# Patient Record
Sex: FEMALE | Race: WHITE | NOT HISPANIC OR LATINO | Employment: UNEMPLOYED | URBAN - METROPOLITAN AREA
[De-identification: names, ages, dates, MRNs, and addresses within clinical notes are randomized per-mention and may not be internally consistent; named-entity substitution may affect disease eponyms.]

---

## 2023-04-30 NOTE — PROGRESS NOTES
"Subjective   Patient ID: Rhona Vincent is a 32 y.o. female who presents for Palpitations (She is 22 weeks pregnant.  ).    HPI     31 yo female, who is 22 wks pregnant, presents c/o palpitations  Began late march  Come and go  No particular trigger.   Co rapid heart beat; no fluttering  Has had occ assoc sob.   Brief episodes   On Friday she had a episode of her hands going numb and even her lip started feeling numb  Saw her gyn dr carlisle on fri and had the episode while seeing him  BP and HR were ok per pt  Does not feel the palpitations are triggered by stress but after they happen she does feel nervous or anxious about them      No assoc chest pains  Occ feels lightheaded  Had CBC in feb- wnl and TSH in march wnl  Miscarriage last  summer    Has 2 sons and this baby is a boy    Do not happen when working out  No recent URI, fevers or chills    No nausea, vomitting, reflux, abd pain. No diarrhea or constipation issues;  no LE edema;     Review of Systems  ROS as stated in HPI    Objective   /76   Pulse 90   Temp 36.9 °C (98.5 °F)   Ht 1.676 m (5' 6\")   Wt 57.4 kg (126 lb 9.6 oz)   BMI 20.43 kg/m²     Physical Exam  Vitals and nursing note reviewed.   Constitutional:       Appearance: Normal appearance. She is normal weight.   HENT:      Head: Normocephalic and atraumatic.      Right Ear: Tympanic membrane, ear canal and external ear normal.      Left Ear: Tympanic membrane, ear canal and external ear normal.      Nose: Nose normal.      Mouth/Throat:      Mouth: Mucous membranes are moist.      Pharynx: Oropharynx is clear.   Eyes:      Extraocular Movements: Extraocular movements intact.      Conjunctiva/sclera: Conjunctivae normal.      Pupils: Pupils are equal, round, and reactive to light.   Cardiovascular:      Rate and Rhythm: Normal rate and regular rhythm.   Pulmonary:      Effort: Pulmonary effort is normal.      Breath sounds: Normal breath sounds.   Abdominal:      General: Abdomen is " flat. Bowel sounds are normal.      Palpations: Abdomen is soft.   Musculoskeletal:         General: Normal range of motion.      Cervical back: Neck supple.   Skin:     General: Skin is warm and dry.   Neurological:      General: No focal deficit present.      Mental Status: She is alert and oriented to person, place, and time.   Psychiatric:         Mood and Affect: Mood normal.         Behavior: Behavior normal.         Thought Content: Thought content normal.         Judgment: Judgment normal.         Assessment/Plan   Problem List Items Addressed This Visit    None  Visit Diagnoses       Healthcare maintenance    -  Primary    Relevant Orders    CBC    Comprehensive Metabolic Panel    TSH with reflex to Free T4 if abnormal    Heart palpitations        Relevant Orders    Holter or Event Cardiac Monitor    Transthoracic Echo (TTE) Complete    ECG 12 lead (Clinic Performed)          Check EKG  Check labs  Check holter and echo  Fu or go to ER if persists or worsens

## 2023-05-01 ENCOUNTER — OFFICE VISIT (OUTPATIENT)
Dept: PRIMARY CARE | Facility: CLINIC | Age: 33
End: 2023-05-01
Payer: COMMERCIAL

## 2023-05-01 VITALS
HEIGHT: 66 IN | DIASTOLIC BLOOD PRESSURE: 76 MMHG | SYSTOLIC BLOOD PRESSURE: 120 MMHG | BODY MASS INDEX: 20.34 KG/M2 | HEART RATE: 90 BPM | WEIGHT: 126.6 LBS | TEMPERATURE: 98.5 F

## 2023-05-01 DIAGNOSIS — Z00.00 HEALTHCARE MAINTENANCE: Primary | ICD-10-CM

## 2023-05-01 DIAGNOSIS — R00.2 HEART PALPITATIONS: ICD-10-CM

## 2023-05-01 LAB
NON-UH HIE A/G RATIO: 1
NON-UH HIE ALB: 3.2 G/DL (ref 3.4–5)
NON-UH HIE ALK PHOS: 56 UNIT/L (ref 46–116)
NON-UH HIE BILIRUBIN, TOTAL: 0.5 MG/DL (ref 0.2–1)
NON-UH HIE BUN/CREAT RATIO: 16.7
NON-UH HIE BUN: 10 MG/DL (ref 9–23)
NON-UH HIE CALCIUM: 9 MG/DL (ref 8.7–10.4)
NON-UH HIE CALCULATED OSMOLALITY: 274 MOSM/KG (ref 275–295)
NON-UH HIE CHLORIDE: 106 MMOL/L (ref 98–107)
NON-UH HIE CO2, VENOUS: 28 MMOL/L (ref 20–31)
NON-UH HIE CREATININE: 0.6 MG/DL (ref 0.5–0.8)
NON-UH HIE GFR AA: >60
NON-UH HIE GLOBULIN: 3.2 G/DL
NON-UH HIE GLOMERULAR FILTRATION RATE: >60 ML/MIN/1.73M?
NON-UH HIE GLUCOSE: 76 MG/DL (ref 74–106)
NON-UH HIE GOT: 18 UNIT/L (ref 15–37)
NON-UH HIE GPT: 7 UNIT/L (ref 10–49)
NON-UH HIE HCT: 37.1 % (ref 36–46)
NON-UH HIE HGB: 12.9 G/DL (ref 12–16)
NON-UH HIE INSTR WBC ND: 7.6
NON-UH HIE K: 4 MMOL/L (ref 3.5–5.1)
NON-UH HIE MCH: 30.1 PG (ref 27–34)
NON-UH HIE MCHC: 34.9 G/DL (ref 32–37)
NON-UH HIE MCV: 86.3 FL (ref 80–100)
NON-UH HIE MPV: 9 FL (ref 7.4–10.4)
NON-UH HIE NA: 138 MMOL/L (ref 135–145)
NON-UH HIE PLATELET: 145 X10 (ref 150–450)
NON-UH HIE RBC: 4.3 X10 (ref 4.2–5.4)
NON-UH HIE RDW: 13.5 % (ref 11.5–14.5)
NON-UH HIE TOTAL PROTEIN: 6.4 G/DL (ref 5.7–8.2)
NON-UH HIE TSH: 2.58 UIU/ML (ref 0.55–4.78)
NON-UH HIE WBC: 7.6 X10 (ref 4.5–11)

## 2023-05-01 PROCEDURE — 99214 OFFICE O/P EST MOD 30 MIN: CPT | Performed by: FAMILY MEDICINE

## 2023-05-01 PROCEDURE — 93000 ELECTROCARDIOGRAM COMPLETE: CPT | Performed by: FAMILY MEDICINE

## 2023-05-01 PROCEDURE — 1036F TOBACCO NON-USER: CPT | Performed by: FAMILY MEDICINE

## 2023-05-01 RX ORDER — FOLIC ACID/MULTIVIT,IRON,MINER 0.4MG-18MG
TABLET ORAL
COMMUNITY
Start: 2023-02-02

## 2023-05-01 ASSESSMENT — PATIENT HEALTH QUESTIONNAIRE - PHQ9
2. FEELING DOWN, DEPRESSED OR HOPELESS: NOT AT ALL
1. LITTLE INTEREST OR PLEASURE IN DOING THINGS: NOT AT ALL
SUM OF ALL RESPONSES TO PHQ9 QUESTIONS 1 AND 2: 0

## 2023-05-03 ENCOUNTER — TELEPHONE (OUTPATIENT)
Dept: PRIMARY CARE | Facility: CLINIC | Age: 33
End: 2023-05-03
Payer: COMMERCIAL

## 2023-05-03 DIAGNOSIS — D69.6 THROMBOCYTOPENIA (CMS-HCC): ICD-10-CM

## 2023-05-03 NOTE — TELEPHONE ENCOUNTER
----- Message from Bev Taylor, DO sent at 5/2/2023  7:37 PM EDT -----  Please let pt know that her red blood cell counts, sugar, electrolytes, thyroid, liver, and kidney function are all normal.  Her platelet count was slightly low at 145(should be above 150).   I recommend rechecking this in a couple wks.   Enter order for cbc with diff dx: thrombocytopenia

## 2024-01-11 ENCOUNTER — OFFICE VISIT (OUTPATIENT)
Dept: DERMATOLOGY | Facility: CLINIC | Age: 34
End: 2024-01-11
Payer: COMMERCIAL

## 2024-01-11 DIAGNOSIS — Z12.83 SKIN CANCER SCREENING: Primary | ICD-10-CM

## 2024-01-11 DIAGNOSIS — D22.9 NEVUS: ICD-10-CM

## 2024-01-11 DIAGNOSIS — L81.4 LENTIGO: ICD-10-CM

## 2024-01-11 DIAGNOSIS — L71.9 ROSACEA: ICD-10-CM

## 2024-01-11 PROCEDURE — 1036F TOBACCO NON-USER: CPT | Performed by: NURSE PRACTITIONER

## 2024-01-11 PROCEDURE — 99203 OFFICE O/P NEW LOW 30 MIN: CPT | Performed by: NURSE PRACTITIONER

## 2024-01-11 NOTE — PROGRESS NOTES
Subjective     Rhona Vincent is a 33 y.o. female who presents for the following: Skin Check.     Patient is a patient skin exam patient states that she has recently given birth and during pregnancy she noticed some changing moles.    Review of Systems:  No other skin or systemic complaints other than what is documented elsewhere in the note.    The following portions of the chart were reviewed this encounter and updated as appropriate:       Skin Cancer History  No skin cancer on file.    Specialty Problems    None    Past Medical History:  Rhona Vincent  has no past medical history on file.    Past Surgical History:  Rhona Vincent  has a past surgical history that includes Other surgical history (03/02/2018).    Family History:  Patient family history is not on file.    Social History:  Rhona Vincent  reports that she has never smoked. She has never used smokeless tobacco. She reports that she does not currently use alcohol. She reports that she does not use drugs.    Allergies:  Patient has no known allergies.    Current Medications / CAM's:    Current Outpatient Medications:     PNV cmb#95-ferrous fumarate-FA (Prenatal Multivitamins) 28 mg iron- 800 mcg tablet, 1 tabs, ORAL, DAILY, Refill(s) 0, Date: 02/02/2023 13:48:00 EST, Disp: , Rfl:      Objective   Well appearing patient in no apparent distress; mood and affect are within normal limits.    A full examination was performed including scalp, head, eyes, ears, nose, lips, neck, chest, axillae, abdomen, back, buttocks, bilateral upper extremities, bilateral lower extremities, hands, feet, fingers, toes, fingernails, and toenails. All findings within normal limits unless otherwise noted below.    Assessment/Plan   1. Skin cancer screening    The patient presented for a routine skin examination today. There are no specific concerns regarding skin health and no new or changing moles, lesions, or rashes.     Assessment: Based on the comprehensive  skin examination, there were no concerning or abnormal findings. The patient's skin appeared to be in good health, without any notable dermatologic conditions or lesions.    Plan: Given the absence of any significant skin findings, no specific interventions or treatments are warranted at this time. The patient was educated on the importance of regular skin self-examinations and advised to promptly report any changes or concerns. Routine follow-up for a skin examination was recommended.    -These lesions have benign, reassuring patterns on dermoscopy.  -There were no concerning features found on exam today.  -Recommend continued self-observation, and to contact the office if any changes in nevi are  noticed.    Discussed/information given on safe sun practices and use of sunscreen, sun protective clothing or sun avoidance. Recommend to use OTC medication of sunscreen SPF 30 or higher on a daily basis prior to sun exposure to reduce the risk of skin cancer.    Contact Office if: Any lesions change in size, shape or color; itch, bum or bleed.         2. Lentigo  Benign pigmented macules appearing in sun exposed areas     Solar lentigo (a type of lentigo also known as a senile lentigo, age spot, or liver spot) is a benign pigmented macule appearing on fair-skinned individuals that is related to ultraviolet radiation (UVR) exposure, typically from the sun.     PLAN:  Limiting sun exposure through avoidance, protective clothing, and use of sunscreens can help prevent the appearance of solar lentigines.    If lesion changes or becomes symptomatic she should return to clinic    3. Nevus  Multiple benign appearing flesh colored to pigmented macules and papules     Plan: Counseling.  I counseled the patient regarding the following:  Instructions: Monthly self-skin checks to monitor for any changes in moles are recommended. Expectations: Benign Nevi are pigmented nests of cells within the skin.No treatment is necessary.  Contact Office if: Any moles change in size, shape or color; itch, bum or bleed.    4. Rosacea  Head - Anterior (Face)  Mild erythema on exam, expresses flushing regularly    PLAN:  Patient does not wish to proceed with prescription treatments at this time  Will use OTC azelaic acid 10% and notify us if she wishes to escalate

## 2024-03-27 ENCOUNTER — TELEMEDICINE (OUTPATIENT)
Dept: PRIMARY CARE | Facility: CLINIC | Age: 34
End: 2024-03-27
Payer: COMMERCIAL

## 2024-03-27 DIAGNOSIS — N61.0 MASTITIS: Primary | ICD-10-CM

## 2024-03-27 PROCEDURE — 1036F TOBACCO NON-USER: CPT | Performed by: NURSE PRACTITIONER

## 2024-03-27 PROCEDURE — 99213 OFFICE O/P EST LOW 20 MIN: CPT | Performed by: NURSE PRACTITIONER

## 2024-03-27 RX ORDER — AMOXICILLIN AND CLAVULANATE POTASSIUM 875; 125 MG/1; MG/1
875 TABLET, FILM COATED ORAL 2 TIMES DAILY
Qty: 20 TABLET | Refills: 0 | Status: SHIPPED | OUTPATIENT
Start: 2024-03-27 | End: 2024-04-06

## 2024-03-27 ASSESSMENT — ENCOUNTER SYMPTOMS
SHORTNESS OF BREATH: 0
DIARRHEA: 0
COUGH: 0
CHILLS: 1
ABDOMINAL PAIN: 0
VOMITING: 0
FEVER: 1
FATIGUE: 1
NAUSEA: 0

## 2024-03-28 NOTE — PATIENT INSTRUCTIONS
Antibiotic has been sent to the pharmacy for you   Follow up with OBGYN as discussed    ? Feed your baby when they are hungry. If milk is flowing from the breast with mastitis, you can feed your baby on that side.  ? Only use a breast pump if you need to. Do not try to empty your breast completely, as this can make mastitis worse.  ? Avoid using nipple shields.  ? Hold a cold compress on your breast. This can help with both pain and swelling. Some people find that a warm, wet cloth on the breast feels better than cold. Use whichever feels better.  ? Take acetaminophen (sample brand name: Tylenol) or ibuprofen (sample brand names: Advil, Motrin) to help with pain or fever. Do not take aspirin.  ? Gently massage your breast to help unblock the milk ducts. Start at the part of the breast that is swollen or sore, and stroke gently toward your nipple. This can help improve milk flow.  ? Wear a bra that is supportive and fits well, but is not too tight.  ? Drink plenty of fluids.  ? Rest when possible.

## 2024-03-28 NOTE — PROGRESS NOTES
Subjective   Chief Complaint: No chief complaint on file..    I preformed this visit using real-time telehealth tools, including and audio/video connection between Rhona Vincent location: [place] and Chavo Santo CNP Location: [place]      HPI   Rhona Vincent is a 33 y.o. female who presents for No chief complaint on file..    Patient reports that she believes she has mastitis in her right breast. She started out with flu like symptoms yesterday (body aches, chills, fatigue). Today she noticed a lump in there right part of her right breast.   She is currently breastfeeding her full term 7 month old baby. She is also currently pregnant and 8 weeks along.     Patient reports that she has experienced mastitis in the past (3 years ago) and reports that these symptoms are similar. She had a temperature of 100.1 F earlier.     She denies any warmth or redness of the area.   She plans to reach out to her OBGYN regarding this but would like to have an antibiotic on hand incase it becomes worse   She has been using this right breast to breasfeed and last  at 7pm.      Patient denies nausea, vomiting, diarrhea, chest pain, heart palpations, or shortness of breath.         Review of Systems   Constitutional:  Positive for chills, fatigue and fever.   Respiratory:  Negative for cough and shortness of breath.    Cardiovascular:  Negative for chest pain.   Gastrointestinal:  Negative for abdominal pain, diarrhea, nausea and vomiting.   Skin:         Right breast lump and pain        Objective   There were no vitals taken for this visit.  BSA There is no height or weight on file to calculate BSA.      Physical Exam  Orders Only on 05/01/2023   Component Date Value Ref Range Status    [facility]- [facility] HCT 05/01/2023 37.1  36.0 - 46.0 % Final    [facility] RBC 05/01/2023 4.30  4.20 - 5.40 x10 Final    Note: RBC morphology is normal unless otherwise stated.  Evaluation performed only if differential is requested.     [facility] Platelet 05/01/2023 145 (L)  150 - 450 x10 Final    [facility] Instr WBC ND 05/01/2023 7.6   Final    [facility] MCHC 05/01/2023 34.9  32.0 - 37.0 g/dL Final    [facility] MCV 05/01/2023 86.3  80.0 - 100.0 fL Final    [facility] MPV 05/01/2023 9.0  7.4 - 10.4 fL Final    [facility] HGB 05/01/2023 12.9  12.0 - 16.0 g/dL Final    [facility] WBC 05/01/2023 7.6  4.5 - 11.0 x10 Final    [facility] RDW 05/01/2023 13.5  11.5 - 14.5 % Final    [facility] MCH 05/01/2023 30.1  27.0 - 34.0 pg Final    [facility] TSH 05/01/2023 2.58  0.55 - 4.78 uIU/ml Final    Comment: -  Do not use samples that contain fluorescein. Fluorescein levels > 0.24 ?g/mL may decrease results in this assay  -  Patients undergoing retinal fluorescein angiography can retain amounts of fluorescein in the body for up to 48?72 hours post-treatment. Such samples can produce falsely depressed values when tested with this assay, and should not be tested      [facility] GFR AA 05/01/2023 >60   Final    Comment:  GFR CalcMedical judgement is necessary to interpret GFR.  The calculated GFR may not accurately reflect renal status in patients >70 years, pregnant women, acutely ill hospitalized patients and patients with acute renal failure or known renal disease.  The MDRD GFR formula is valid only for adults greater than 18 years of age.  Note:  Creatinine clearance (not GFR) should be used for drug dosing.      [facility] Bilirubin, Total 05/01/2023 0.50  0.20 - 1.00 mg/dL Final    Use of this assay is not recommended for patients undergoing treatment with eltrombopag due to the potential for falsely elevated results.    [facility] Chloride 05/01/2023 106  98 - 107 mmol/L Final    [facility] A/G Ratio 05/01/2023 1.0   Final    [facility] Na 05/01/2023 138  135 - 145 mmol/L Final    [facility] BUN/Creat Ratio 05/01/2023 16.7   Final    [facility] GPT 05/01/2023 7 (L)  10 - 49 unit/L Final    [facility] Alk Phos 05/01/2023 56  46 - 116  unit/L Final    [facility] Creatinine 05/01/2023 0.6  0.5 - 0.8 mg/dL Final    [facility] Total Protein 05/01/2023 6.4  5.7 - 8.2 g/dL Final    Total Protein results may be increased in patients receiving dextran as a blood volume expander    [facility] CO2, venous 05/01/2023 28.0  20.0 - 31.0 mmol/L Final    NON- [facility] Glomerular Filtration R* 05/01/2023 >60  mL/min/1.73m? Final    Comment: Non- GFR CalcMedical judgement is necessary to interpret GFR.  The calculated GFR may not accurately reflect renal status in patients >70 years, pregnant women, acutely ill hospitalized patients and patients with acute renal failure or known renal disease.  The MDRD GFR formula is valid only for adults greater than 18 years of age.  Note:  Creatinine clearance (not GFR) should be used for drug dosing.      [facility] Calculated Osmolality 05/01/2023 274 (L)  275 - 295 mOsm/kg Final    [facility] K 05/01/2023 4.0  3.5 - 5.1 mmol/L Final    NON- [facility] Globulin 05/01/2023 3.2  g/dL Final    [facility] BUN 05/01/2023 10  9 - 23 mg/dL Final    Comment: -  Venipuncture should occur prior to N-Acetyl Cysteine (NAC) or Metamizole (Sulpyrine) administration due to the potential for falsely depressed results.  -  Blood samples from some patients with monoclonal gammopathies may produce falsely elevated results      [facility] Calcium 05/01/2023 9.0  8.7 - 10.4 mg/dL Final    [facility] GOT 05/01/2023 18  15 - 37 unit/L Final    [facility] Glucose 05/01/2023 76  74 - 106 mg/dL Final    [facility] ALB 05/01/2023 3.2 (L)  3.4 - 5.0 g/dL Final     Current Outpatient Medications on File Prior to Visit   Medication Sig Dispense Refill    PNV cmb#95-ferrous fumarate-FA (Prenatal Multivitamins) 28 mg iron- 800 mcg tablet 1 tabs, ORAL, DAILY, Refill(s) 0, Date: 02/02/2023 13:48:00 EST       No current facility-administered medications on file prior to visit.     No images are attached to the encounter.            Assessment/Plan    Problem List Items Addressed This Visit             ICD-10-CM    Mastitis - Primary N61.0    Relevant Medications    amoxicillin-pot clavulanate (Augmentin) 875-125 mg tablet

## 2024-06-14 ENCOUNTER — HOSPITAL ENCOUNTER (OUTPATIENT)
Dept: RADIOLOGY | Facility: CLINIC | Age: 34
Discharge: HOME | End: 2024-06-14
Payer: COMMERCIAL

## 2024-06-14 DIAGNOSIS — Z34.90 ENCOUNTER FOR SUPERVISION OF NORMAL PREGNANCY, UNSPECIFIED, UNSPECIFIED TRIMESTER (HHS-HCC): ICD-10-CM

## 2024-06-14 PROCEDURE — 76805 OB US >/= 14 WKS SNGL FETUS: CPT

## 2024-12-16 ENCOUNTER — APPOINTMENT (OUTPATIENT)
Dept: PRIMARY CARE | Facility: CLINIC | Age: 34
End: 2024-12-16
Payer: COMMERCIAL

## 2025-02-06 ENCOUNTER — APPOINTMENT (OUTPATIENT)
Dept: PRIMARY CARE | Facility: CLINIC | Age: 35
End: 2025-02-06
Payer: COMMERCIAL

## 2025-04-10 ENCOUNTER — APPOINTMENT (OUTPATIENT)
Dept: PRIMARY CARE | Facility: CLINIC | Age: 35
End: 2025-04-10
Payer: COMMERCIAL

## 2025-05-13 ENCOUNTER — APPOINTMENT (OUTPATIENT)
Dept: PRIMARY CARE | Facility: CLINIC | Age: 35
End: 2025-05-13
Payer: COMMERCIAL

## 2025-05-15 ENCOUNTER — APPOINTMENT (OUTPATIENT)
Dept: PRIMARY CARE | Facility: CLINIC | Age: 35
End: 2025-05-15
Payer: COMMERCIAL

## 2025-06-16 NOTE — PROGRESS NOTES
Subjective   Patient ID: Rhona Vincent is a 34 y.o. female who presents for No chief complaint on file..    HPI   34 you female presents for physical    i last saw pt 5/2023 when she was pregnant and was co palpitations        sees gyn dr carlisle for exams.   has 3 sons   periods are reg;     immunizations:   Tdap 2020 or with last preg?  flu shot  covid shots- had     BMI 20?  has been trying to exercise and eat healthy.   dad with hx of HLD.      She denies any vision changes. no glasses or contacts; does not see optho  sees her dentist for regular cleanings      She denies any chest pains, palpitations, edema, shortness of breath, abdominal pains, reflux, nausea, vomiting, diarrhea, constipation, blood in stool, melena.      Denies any mole changes.     flu shot in fall   had covid shots  Tdap 2020 with preg?  healthy lifestyle-diet, exercise                     .   check labs  sees gyn         Review of Systems  ROS as stated in HPI    Objective   There were no vitals taken for this visit.    Physical Exam  Constitutional: A&O; NAD  HEENT: conjunctiva normal. EOMI; PERRLA; lids normal; TM's clear;   Neck: supple; No lymphadenopathy   Heart: RRR     Lungs: CTA bilateral   Abd: Soft, Nontender, Nondistended  Ext:  No edema;    Neuro: No gross neuro deficits     Psych: Judgment, orientation, mood, affect, insight wnl   Assessment/Plan   {Assess/PlanSmartLinks:00546}

## 2025-06-17 ENCOUNTER — APPOINTMENT (OUTPATIENT)
Dept: PRIMARY CARE | Facility: CLINIC | Age: 35
End: 2025-06-17
Payer: COMMERCIAL

## 2025-06-17 DIAGNOSIS — Z00.00 HEALTHCARE MAINTENANCE: Primary | ICD-10-CM

## 2025-06-19 ENCOUNTER — OFFICE VISIT (OUTPATIENT)
Dept: PRIMARY CARE | Facility: CLINIC | Age: 35
End: 2025-06-19
Payer: COMMERCIAL

## 2025-06-19 ENCOUNTER — APPOINTMENT (OUTPATIENT)
Dept: PRIMARY CARE | Facility: CLINIC | Age: 35
End: 2025-06-19
Payer: COMMERCIAL

## 2025-06-19 VITALS
DIASTOLIC BLOOD PRESSURE: 76 MMHG | BODY MASS INDEX: 19.7 KG/M2 | SYSTOLIC BLOOD PRESSURE: 134 MMHG | HEART RATE: 54 BPM | HEIGHT: 66 IN | TEMPERATURE: 98 F | WEIGHT: 122.6 LBS

## 2025-06-19 DIAGNOSIS — Z00.00 HEALTHCARE MAINTENANCE: Primary | ICD-10-CM

## 2025-06-19 PROCEDURE — 1036F TOBACCO NON-USER: CPT | Performed by: FAMILY MEDICINE

## 2025-06-19 PROCEDURE — 3008F BODY MASS INDEX DOCD: CPT | Performed by: FAMILY MEDICINE

## 2025-06-19 PROCEDURE — 99395 PREV VISIT EST AGE 18-39: CPT | Performed by: FAMILY MEDICINE

## 2025-06-19 RX ORDER — LEVONORGESTREL 52 MG/1
1 INTRAUTERINE DEVICE INTRAUTERINE ONCE
COMMUNITY

## 2025-06-19 ASSESSMENT — PATIENT HEALTH QUESTIONNAIRE - PHQ9
2. FEELING DOWN, DEPRESSED OR HOPELESS: NOT AT ALL
SUM OF ALL RESPONSES TO PHQ9 QUESTIONS 1 AND 2: 0
1. LITTLE INTEREST OR PLEASURE IN DOING THINGS: NOT AT ALL

## 2025-06-19 NOTE — PROGRESS NOTES
"Subjective   Patient ID: Rhona Vincent is a 34 y.o. female who presents for Annual Exam (She is not fasting for blood work today.  Sees GYN for exams. ).    HPI   34 you female presents for physical    i last saw pt 5/2023       sees gyn dr carlisle   has 4 boys (11/2018, 4/2020, 8/2023 10/2024)    stays at home wiht them   has mirena IUD    immunizations:   Tdap 2024  RSV-had with preg  flu shot- in fall   covid shots- had     BMI 19  has been trying to exercise and eat healthy.   dad with hx of HLD.      She denies any vision changes. no glasses or contacts; does not see optho  sees her dentist for regular cleanings      She denies any chest pains, palpitations, edema, shortness of breath, abdominal pains, reflux, nausea, vomiting, diarrhea, constipation, blood in stool, melena.      Denies any mole changes  see derm for skin checks     pgf colon CA  dad with HLD  PGF with colon CA in 80s  mom with HTN/MI/stents at 70yo  MGF with CAD  sister with HLD         Review of Systems  ROS as stated in HPI    Objective   /76   Pulse 54   Temp 36.7 °C (98 °F)   Ht 1.676 m (5' 6\")   Wt 55.6 kg (122 lb 9.6 oz)   Breastfeeding Unknown   BMI 19.79 kg/m²     Physical Exam  Constitutional: A&O; NAD  HEENT: conjunctiva normal. EOMI; PERRLA; lids normal; TM's clear;   Neck: supple; No lymphadenopathy   Heart: RRR     Lungs: CTA bilateral   Abd: Soft, Nontender, Nondistended  Ext:  No edema;    Neuro: No gross neuro deficits     Psych: Judgment, orientation, mood, affect, insight wnl   Assessment/Plan   Problem List Items Addressed This Visit    None  Visit Diagnoses         Codes      Healthcare maintenance    -  Primary Z00.00    Relevant Orders    CBC    Comprehensive Metabolic Panel    Hemoglobin A1C    Lipid Panel    TSH with reflex to Free T4 if abnormal    Vitamin D 25-Hydroxy,Total (for eval of Vitamin D levels)          flu shot in fall   had covid shots  Tdap 2024 and RSV 2024 with preg  healthy " lifestyle-diet, exercise                     .   check labs  sees gyn

## 2025-08-21 ENCOUNTER — APPOINTMENT (OUTPATIENT)
Dept: PRIMARY CARE | Facility: CLINIC | Age: 35
End: 2025-08-21
Payer: COMMERCIAL

## 2026-07-02 ENCOUNTER — APPOINTMENT (OUTPATIENT)
Dept: PRIMARY CARE | Facility: CLINIC | Age: 36
End: 2026-07-02
Payer: COMMERCIAL